# Patient Record
Sex: MALE | Race: WHITE | NOT HISPANIC OR LATINO
[De-identification: names, ages, dates, MRNs, and addresses within clinical notes are randomized per-mention and may not be internally consistent; named-entity substitution may affect disease eponyms.]

---

## 2018-12-17 ENCOUNTER — TRANSCRIPTION ENCOUNTER (OUTPATIENT)
Age: 58
End: 2018-12-17

## 2019-01-21 ENCOUNTER — TRANSCRIPTION ENCOUNTER (OUTPATIENT)
Age: 59
End: 2019-01-21

## 2019-05-22 ENCOUNTER — TRANSCRIPTION ENCOUNTER (OUTPATIENT)
Age: 59
End: 2019-05-22

## 2021-09-14 ENCOUNTER — APPOINTMENT (OUTPATIENT)
Dept: UROLOGY | Facility: CLINIC | Age: 61
End: 2021-09-14

## 2021-09-14 PROBLEM — Z00.00 ENCOUNTER FOR PREVENTIVE HEALTH EXAMINATION: Status: ACTIVE | Noted: 2021-09-14

## 2021-09-24 ENCOUNTER — APPOINTMENT (OUTPATIENT)
Dept: UROLOGY | Facility: CLINIC | Age: 61
End: 2021-09-24
Payer: COMMERCIAL

## 2021-09-24 VITALS
WEIGHT: 150 LBS | SYSTOLIC BLOOD PRESSURE: 118 MMHG | HEIGHT: 63 IN | HEART RATE: 80 BPM | DIASTOLIC BLOOD PRESSURE: 78 MMHG | TEMPERATURE: 97.6 F | BODY MASS INDEX: 26.58 KG/M2

## 2021-09-24 DIAGNOSIS — Z78.9 OTHER SPECIFIED HEALTH STATUS: ICD-10-CM

## 2021-09-24 DIAGNOSIS — Z80.1 FAMILY HISTORY OF MALIGNANT NEOPLASM OF TRACHEA, BRONCHUS AND LUNG: ICD-10-CM

## 2021-09-24 DIAGNOSIS — Z86.39 PERSONAL HISTORY OF OTHER ENDOCRINE, NUTRITIONAL AND METABOLIC DISEASE: ICD-10-CM

## 2021-09-24 DIAGNOSIS — Z86.59 PERSONAL HISTORY OF OTHER MENTAL AND BEHAVIORAL DISORDERS: ICD-10-CM

## 2021-09-24 PROCEDURE — 99204 OFFICE O/P NEW MOD 45 MIN: CPT

## 2021-09-24 RX ORDER — SILDENAFIL 20 MG/1
20 TABLET ORAL
Qty: 24 | Refills: 0 | Status: ACTIVE | COMMUNITY
Start: 2021-01-02

## 2021-09-24 RX ORDER — ROSUVASTATIN CALCIUM 5 MG/1
5 TABLET, FILM COATED ORAL
Qty: 90 | Refills: 0 | Status: ACTIVE | COMMUNITY
Start: 2021-08-16

## 2021-09-24 NOTE — PHYSICAL EXAM
[General Appearance - Well Developed] : well developed [General Appearance - Well Nourished] : well nourished [Normal Appearance] : normal appearance [Well Groomed] : well groomed [General Appearance - In No Acute Distress] : no acute distress [Edema] : no peripheral edema [Respiration, Rhythm And Depth] : normal respiratory rhythm and effort [Exaggerated Use Of Accessory Muscles For Inspiration] : no accessory muscle use [Abdomen Soft] : soft [Abdomen Tenderness] : non-tender [Costovertebral Angle Tenderness] : no ~M costovertebral angle tenderness [Normal Station and Gait] : the gait and station were normal for the patient's age [] : no rash [No Focal Deficits] : no focal deficits [Oriented To Time, Place, And Person] : oriented to person, place, and time [Affect] : the affect was normal [Mood] : the mood was normal [Not Anxious] : not anxious [No Palpable Adenopathy] : no palpable adenopathy [FreeTextEntry1] : varicocele present on left testicle on palpation

## 2021-09-24 NOTE — HISTORY OF PRESENT ILLNESS
[Currently Experiencing ___] :  [unfilled] [Abdominal Pain] : abdominal pain [1] : 1 [FreeTextEntry1] : Mr. Sosa is a very pleasant 61 year old man here today for intermittent left testicular pain. He reports that the pain is 3/10 and dull in nature. He reports that his portable speaker jostled against the left testicle multiple times before the pain had started. He said it came on suddenly, was very painful, and now it is intermittent and dull that comes and goes.He denies any other subsequent trauma or rough intercourse that may have caused the pain.\par He reports that nothing makes it better or worse.\par Reports that he had a previous episode 28 years ago that went away.\par He has a history of ED that he takes Viagra for, he reports helps well.\par He denies any lymphedema, discharge, hematuria, hematospermia or fevers.\par Denies any family history of prostate, kidney or testicular cancer.\par Denies history of smoking.\par  [de-identified] : left testicle

## 2021-09-24 NOTE — ASSESSMENT
[FreeTextEntry1] : Mr. Sosa is a very pleasant 61 year old man here today for intermittent left testicular pain. He reports that the pain is 3/10 and dull in nature. He reports that his portable speaker jostled against the left testicle multiple times before the pain had started. He said it came on suddenly, was very painful, and now it is intermittent and dull that comes and goes.He denies any other subsequent trauma or rough intercourse that may have caused the pain.\par He reports that nothing makes it better or worse.\par He has a history of ED that he takes Viagra for, he reports helps well.\par He denies any lymphedema, discharge, hematuria, hematospermia or fevers.\par Denies any family history of prostate, kidney or testicular cancer.\par Denies history of smoking.\par Discussion of varicoceles was done, regarding pain, prognosis and treatment plans including surgical management.\par Urinalysis \par -Urine culture \par -Scrotal US to be done on return visit.\par PSA level today for screening for prostate cancer\par

## 2021-09-24 NOTE — REVIEW OF SYSTEMS
[Negative] : Heme/Lymph [FreeTextEntry2] : pain in left testicle , started a month ago dull pain comes and goes , happens when he walks

## 2021-09-25 LAB
APPEARANCE: CLEAR
BACTERIA: NEGATIVE
BILIRUBIN URINE: NEGATIVE
BLOOD URINE: NEGATIVE
COLOR: COLORLESS
GLUCOSE QUALITATIVE U: NEGATIVE
HYALINE CASTS: 0 /LPF
KETONES URINE: NEGATIVE
LEUKOCYTE ESTERASE URINE: NEGATIVE
MICROSCOPIC-UA: NORMAL
NITRITE URINE: NEGATIVE
PH URINE: 6.5
PROTEIN URINE: NEGATIVE
PSA SERPL-MCNC: 1.18 NG/ML
RED BLOOD CELLS URINE: 0 /HPF
SPECIFIC GRAVITY URINE: 1.01
SQUAMOUS EPITHELIAL CELLS: 0 /HPF
UROBILINOGEN URINE: NORMAL
WHITE BLOOD CELLS URINE: 0 /HPF

## 2021-09-27 LAB — BACTERIA UR CULT: NORMAL

## 2021-10-13 ENCOUNTER — APPOINTMENT (OUTPATIENT)
Dept: UROLOGY | Facility: CLINIC | Age: 61
End: 2021-10-13
Payer: COMMERCIAL

## 2021-10-13 ENCOUNTER — APPOINTMENT (OUTPATIENT)
Dept: UROLOGY | Facility: CLINIC | Age: 61
End: 2021-10-13

## 2021-10-13 PROCEDURE — 99214 OFFICE O/P EST MOD 30 MIN: CPT

## 2021-10-13 PROCEDURE — 76870 US EXAM SCROTUM: CPT

## 2021-10-13 NOTE — HISTORY OF PRESENT ILLNESS
[FreeTextEntry1] : Very pleasant 61-year-old gentleman who presents for follow-up of intermittent left testicular pain and screening for prostate cancer.  He reports that since his last visit he has not experienced additional episodes of testicular pain.  He underwent a scrotal ultrasound today which demonstrates no abnormalities.  No other complaints.  PSA was noted to be 1.18.

## 2021-10-13 NOTE — ASSESSMENT
[FreeTextEntry1] : Very pleasant 61-year-old gentleman who presents for follow-up of scrotal discomfort, erectile dysfunction, screening for prostate cancer\par -PSA 1.18\par -Scrotal ultrasound images reviewed demonstrating no masses or other abnormalities\par -Urinalysis demonstrates 0 red blood cells per high-power field\par -Urine culture negative\par -Continue Viagra for erectile dysfunction\par -Follow-up in 1 year with PSA

## 2022-10-14 ENCOUNTER — APPOINTMENT (OUTPATIENT)
Dept: UROLOGY | Facility: CLINIC | Age: 62
End: 2022-10-14

## 2022-10-14 VITALS
HEIGHT: 63 IN | DIASTOLIC BLOOD PRESSURE: 69 MMHG | SYSTOLIC BLOOD PRESSURE: 102 MMHG | HEART RATE: 74 BPM | TEMPERATURE: 96 F | BODY MASS INDEX: 26.58 KG/M2 | WEIGHT: 150 LBS

## 2022-10-14 DIAGNOSIS — N50.82 SCROTAL PAIN: ICD-10-CM

## 2022-10-14 DIAGNOSIS — I86.1 SCROTAL VARICES: ICD-10-CM

## 2022-10-14 PROCEDURE — 99213 OFFICE O/P EST LOW 20 MIN: CPT

## 2022-10-14 RX ORDER — SERTRALINE 25 MG/1
25 TABLET, FILM COATED ORAL
Qty: 30 | Refills: 0 | Status: DISCONTINUED | COMMUNITY
Start: 2021-09-20 | End: 2022-10-14

## 2022-10-14 RX ORDER — BUPROPION HYDROCHLORIDE 75 MG/1
75 TABLET, FILM COATED ORAL
Qty: 30 | Refills: 0 | Status: DISCONTINUED | COMMUNITY
Start: 2021-09-20 | End: 2022-10-14

## 2022-10-14 NOTE — HISTORY OF PRESENT ILLNESS
[None] : None [FreeTextEntry1] : Mr. Sosa is a very pleasant 62 year old man here today for history of testicular discomfort and prostate cancer screening.\par He reports feeling well since he was here last.\par Denies any testicular pain since he was here last year.\par Denies any hematuria, dysuria or urinary retention.

## 2022-10-14 NOTE — ASSESSMENT
[FreeTextEntry1] : Mr. Sosa is a very pleasant 62 year old man here today for history of testicular discomfort and prostate cancer screening.\par He reports feeling well since he was here last.\par Denies any testicular pain since he was here last year.\par Denies any hematuria, dysuria or urinary retention.\par PSA from 09/24/21 1.18.\par PSA.\par RTO 1 year PSA.

## 2022-10-17 LAB — PSA SERPL-MCNC: 1.21 NG/ML

## 2022-10-29 ENCOUNTER — NON-APPOINTMENT (OUTPATIENT)
Age: 62
End: 2022-10-29

## 2023-10-17 ENCOUNTER — APPOINTMENT (OUTPATIENT)
Dept: UROLOGY | Facility: CLINIC | Age: 63
End: 2023-10-17
Payer: COMMERCIAL

## 2023-10-17 VITALS
TEMPERATURE: 96.8 F | DIASTOLIC BLOOD PRESSURE: 75 MMHG | HEIGHT: 63 IN | BODY MASS INDEX: 26.58 KG/M2 | HEART RATE: 66 BPM | OXYGEN SATURATION: 99 % | SYSTOLIC BLOOD PRESSURE: 111 MMHG | WEIGHT: 150 LBS

## 2023-10-17 DIAGNOSIS — Z12.5 ENCOUNTER FOR SCREENING FOR MALIGNANT NEOPLASM OF PROSTATE: ICD-10-CM

## 2023-10-17 PROCEDURE — 99213 OFFICE O/P EST LOW 20 MIN: CPT

## 2023-10-17 RX ORDER — SERTRALINE HYDROCHLORIDE 50 MG/1
50 TABLET, FILM COATED ORAL
Refills: 0 | Status: ACTIVE | COMMUNITY

## 2023-10-18 LAB — PSA SERPL-MCNC: 0.94 NG/ML

## 2025-02-05 ENCOUNTER — APPOINTMENT (OUTPATIENT)
Dept: UROLOGY | Facility: CLINIC | Age: 65
End: 2025-02-05
Payer: COMMERCIAL

## 2025-02-05 VITALS
WEIGHT: 155 LBS | OXYGEN SATURATION: 99 % | TEMPERATURE: 97.4 F | BODY MASS INDEX: 27.46 KG/M2 | DIASTOLIC BLOOD PRESSURE: 70 MMHG | HEIGHT: 63 IN | SYSTOLIC BLOOD PRESSURE: 100 MMHG | HEART RATE: 60 BPM

## 2025-02-05 DIAGNOSIS — Z12.5 ENCOUNTER FOR SCREENING FOR MALIGNANT NEOPLASM OF PROSTATE: ICD-10-CM

## 2025-02-05 PROCEDURE — G2211 COMPLEX E/M VISIT ADD ON: CPT | Mod: NC

## 2025-02-05 PROCEDURE — 99213 OFFICE O/P EST LOW 20 MIN: CPT

## 2025-02-06 LAB — PSA SERPL-MCNC: 1.3 NG/ML
